# Patient Record
Sex: FEMALE | Race: WHITE | NOT HISPANIC OR LATINO | URBAN - METROPOLITAN AREA
[De-identification: names, ages, dates, MRNs, and addresses within clinical notes are randomized per-mention and may not be internally consistent; named-entity substitution may affect disease eponyms.]

---

## 2022-04-22 ENCOUNTER — OFFICE VISIT (OUTPATIENT)
Dept: URBAN - METROPOLITAN AREA TELEHEALTH 2 | Facility: TELEHEALTH | Age: 72
End: 2022-04-22
Payer: MEDICARE

## 2022-04-22 DIAGNOSIS — K21.9 GERD WITHOUT ESOPHAGITIS: ICD-10-CM

## 2022-04-22 DIAGNOSIS — K58.0 IRRITABLE BOWEL SYNDROME WITH DIARRHEA: ICD-10-CM

## 2022-04-22 DIAGNOSIS — K76.0 NAFLD (NONALCOHOLIC FATTY LIVER DISEASE): ICD-10-CM

## 2022-04-22 PROBLEM — 197315008: Status: ACTIVE | Noted: 2022-04-22

## 2022-04-22 PROBLEM — 197125005: Status: ACTIVE | Noted: 2022-04-22

## 2022-04-22 PROBLEM — 266435005: Status: ACTIVE | Noted: 2022-04-22

## 2022-04-22 PROCEDURE — 99203 OFFICE O/P NEW LOW 30 MIN: CPT | Performed by: INTERNAL MEDICINE

## 2022-04-22 RX ORDER — DUTASTERIDE 0.5 MG/1
TAKE 1 CAPSULE (0.5 MG) BY ORAL ROUTE ONCE DAILY CAPSULE, LIQUID FILLED ORAL 1
Qty: 0 | Refills: 0 | Status: ACTIVE | COMMUNITY
Start: 1900-01-01

## 2022-04-22 RX ORDER — ATORVASTATIN CALCIUM 20 MG/1
TAKE 1 TABLET (20 MG) BY ORAL ROUTE ONCE DAILY TABLET, FILM COATED ORAL 1
Qty: 0 | Refills: 0 | Status: ACTIVE | COMMUNITY
Start: 1900-01-01

## 2022-04-22 RX ORDER — MELATONIN 5 MG
CAPSULE ORAL
Qty: 0 | Refills: 0 | Status: ACTIVE | COMMUNITY
Start: 1900-01-01

## 2022-04-22 NOTE — HPI-TODAY'S VISIT:
This is a telephone OV to which patient has agreed to. Limitations of telehealth discussed; she understands and agrees to proceed. Patient's @ home during this virtual OV. Patient referred by Bobo Alves MD for evaluation of abdominal pain. 72 yo pt w several weeks of p-prandial epigastric pain, nausea, following by  intermittent loose non-bloody stools, wo urgency nor incontinence nor tenesmus. Sxs unrelated to the type of food she eats.  Sxs after eating but inconsistent. On Omeprazole prn + AAs. Intermittent DEVIN wo dysphagia / odynophagia. No nocturnal DEVIN sxs. Good appetite and no weight loss nor constitutional sxs.  Normal bm's for most part x for above noted. Colonoscopy 8/18: normal anastomosis and normal colon biopsies. EGD 1/18: NERD, HH and mild Hp-negative gastritis. Had normal labs recently. No COVID-19 exposure and she's fully COVID-19 vaccinated. No other complants.

## 2023-03-31 ENCOUNTER — LAB OUTSIDE AN ENCOUNTER (OUTPATIENT)
Dept: URBAN - METROPOLITAN AREA TELEHEALTH 2 | Facility: TELEHEALTH | Age: 73
End: 2023-03-31

## 2023-03-31 ENCOUNTER — OFFICE VISIT (OUTPATIENT)
Dept: URBAN - METROPOLITAN AREA TELEHEALTH 2 | Facility: TELEHEALTH | Age: 73
End: 2023-03-31
Payer: MEDICARE

## 2023-03-31 DIAGNOSIS — Z83.71 FAMILY HISTORY OF COLONIC POLYPS: ICD-10-CM

## 2023-03-31 DIAGNOSIS — K76.0 NAFLD (NONALCOHOLIC FATTY LIVER DISEASE): ICD-10-CM

## 2023-03-31 DIAGNOSIS — K21.9 GERD WITHOUT ESOPHAGITIS: ICD-10-CM

## 2023-03-31 DIAGNOSIS — K58.0 IRRITABLE BOWEL SYNDROME WITH DIARRHEA: ICD-10-CM

## 2023-03-31 PROCEDURE — 99203 OFFICE O/P NEW LOW 30 MIN: CPT | Performed by: INTERNAL MEDICINE

## 2023-03-31 RX ORDER — SODIUM, POTASSIUM,MAG SULFATES 17.5-3.13G
354ML SOLUTION, RECONSTITUTED, ORAL ORAL
Qty: 345 MILLILITER | Refills: 0 | OUTPATIENT
Start: 2023-03-31 | End: 2023-04-01

## 2023-03-31 RX ORDER — MELATONIN 5 MG
CAPSULE ORAL
Qty: 0 | Refills: 0 | Status: ACTIVE | COMMUNITY
Start: 1900-01-01

## 2023-03-31 RX ORDER — DUTASTERIDE 0.5 MG/1
TAKE 1 CAPSULE (0.5 MG) BY ORAL ROUTE ONCE DAILY CAPSULE, LIQUID FILLED ORAL 1
Qty: 0 | Refills: 0 | Status: ACTIVE | COMMUNITY
Start: 1900-01-01

## 2023-03-31 RX ORDER — LOSARTAN POTASSIUM 50 MG/1
1 TABLET TABLET ORAL ONCE A DAY
Status: ACTIVE | COMMUNITY
Start: 2023-03-31

## 2023-03-31 RX ORDER — MIRTAZAPINE 15 MG/1
1 TABLET AT BEDTIME TABLET, FILM COATED ORAL ONCE A DAY
Status: ACTIVE | COMMUNITY
Start: 2023-03-31

## 2023-03-31 RX ORDER — ATORVASTATIN CALCIUM 20 MG/1
TAKE 1 TABLET (20 MG) BY ORAL ROUTE ONCE DAILY TABLET, FILM COATED ORAL 1
Qty: 0 | Refills: 0 | Status: ACTIVE | COMMUNITY
Start: 1900-01-01

## 2023-03-31 NOTE — HPI-TODAY'S VISIT:
This is a telephone OV to which patient has agreed to. Limitations of telehealth discussed; she understands and agrees to proceed. Patient's @ home during this virtual OV. Patient referred by Bobo Alves MD for evaluation of  chronic GERD and for screening colonoscopy. 71 yo pt w several weeks of p-prandial epigastric pain, heartburn, regurgitation, hoarseness, frequent throat clearing, wo dysphagia /odynophagia. Sxs unrelated to the type of food she eats.  Sxs after eating but inconsistent. On Omeprazole OTC prn + AAs. Intermittent DEVIN wo dysphagia / odynophagia. No nocturnal DEVIN sxs. Good appetite and no weight loss nor constitutional sxs.  Normal bm's for most part x for above noted. Colonoscopy 8/18: normal anastomosis and normal colon biopsies. EGD 1/18: NERD, HH and mild Hp-negative gastritis. Had normal labs recently. No COVID-19 exposure and she's fully COVID-19 vaccinated. No other complants.

## 2023-05-22 ENCOUNTER — CLAIMS CREATED FROM THE CLAIM WINDOW (OUTPATIENT)
Dept: URBAN - METROPOLITAN AREA CLINIC 4 | Facility: CLINIC | Age: 73
End: 2023-05-22
Payer: MEDICARE

## 2023-05-22 ENCOUNTER — CLAIMS CREATED FROM THE CLAIM WINDOW (OUTPATIENT)
Dept: URBAN - METROPOLITAN AREA SURGERY CENTER 18 | Facility: SURGERY CENTER | Age: 73
End: 2023-05-22

## 2023-05-22 ENCOUNTER — CLAIMS CREATED FROM THE CLAIM WINDOW (OUTPATIENT)
Dept: URBAN - METROPOLITAN AREA SURGERY CENTER 18 | Facility: SURGERY CENTER | Age: 73
End: 2023-05-22
Payer: MEDICARE

## 2023-05-22 DIAGNOSIS — D12.4 ADENOMA OF DESCENDING COLON: ICD-10-CM

## 2023-05-22 DIAGNOSIS — K31.89 OTHER DISEASES OF STOMACH AND DUODENUM: ICD-10-CM

## 2023-05-22 DIAGNOSIS — K21.9 ACID REFLUX: ICD-10-CM

## 2023-05-22 DIAGNOSIS — Z86.010 ADENOMAS PERSONAL HISTORY OF COLONIC POLYPS: ICD-10-CM

## 2023-05-22 PROCEDURE — G8907 PT DOC NO EVENTS ON DISCHARG: HCPCS | Performed by: INTERNAL MEDICINE

## 2023-05-22 PROCEDURE — 88312 SPECIAL STAINS GROUP 1: CPT | Performed by: PATHOLOGY

## 2023-05-22 PROCEDURE — 43239 EGD BIOPSY SINGLE/MULTIPLE: CPT | Performed by: INTERNAL MEDICINE

## 2023-05-22 PROCEDURE — 88305 TISSUE EXAM BY PATHOLOGIST: CPT | Performed by: PATHOLOGY

## 2023-05-22 PROCEDURE — 45385 COLONOSCOPY W/LESION REMOVAL: CPT | Performed by: INTERNAL MEDICINE

## 2023-09-22 ENCOUNTER — OFFICE VISIT (OUTPATIENT)
Dept: URBAN - METROPOLITAN AREA TELEHEALTH 2 | Facility: TELEHEALTH | Age: 73
End: 2023-09-22
Payer: MEDICARE

## 2023-09-22 DIAGNOSIS — K21.9 GERD WITHOUT ESOPHAGITIS: ICD-10-CM

## 2023-09-22 DIAGNOSIS — K57.90 DIVERTICULOSIS: ICD-10-CM

## 2023-09-22 DIAGNOSIS — K76.0 NON ALCOHOLIC FATTY LIVER DISEASE: ICD-10-CM

## 2023-09-22 DIAGNOSIS — K58.0 IRRITABLE BOWEL SYNDROME WITH DIARRHEA: ICD-10-CM

## 2023-09-22 PROCEDURE — 99214 OFFICE O/P EST MOD 30 MIN: CPT | Performed by: INTERNAL MEDICINE

## 2023-09-22 RX ORDER — MELATONIN 5 MG
CAPSULE ORAL
Qty: 0 | Refills: 0 | Status: ACTIVE | COMMUNITY
Start: 1900-01-01

## 2023-09-22 RX ORDER — MIRTAZAPINE 15 MG/1
1 TABLET AT BEDTIME TABLET, FILM COATED ORAL ONCE A DAY
Status: ACTIVE | COMMUNITY
Start: 2023-03-31

## 2023-09-22 RX ORDER — LOSARTAN POTASSIUM 50 MG/1
1 TABLET TABLET ORAL ONCE A DAY
Status: ACTIVE | COMMUNITY
Start: 2023-03-31

## 2023-09-22 RX ORDER — DUTASTERIDE 0.5 MG/1
TAKE 1 CAPSULE (0.5 MG) BY ORAL ROUTE ONCE DAILY CAPSULE, LIQUID FILLED ORAL 1
Qty: 0 | Refills: 0 | Status: ACTIVE | COMMUNITY
Start: 1900-01-01

## 2023-09-22 RX ORDER — ATORVASTATIN CALCIUM 20 MG/1
TAKE 1 TABLET (20 MG) BY ORAL ROUTE ONCE DAILY TABLET, FILM COATED ORAL 1
Qty: 0 | Refills: 0 | Status: ACTIVE | COMMUNITY
Start: 1900-01-01

## 2023-09-22 NOTE — HPI-TODAY'S VISIT:
This is a Telehealth OV to which patient has agreed to. Limitations of telehealth discussed; she understands and agrees to proceed. Patient's @ home during this virtual OV. Patient referred by Bobo Alves MD for evaluation of  chronic GERD and for screening colonoscopy. 74 yo pt  for abdominal pain and dyspepsia follow up. She has been feeling better lately w less GERD sxs after eating but inconsistent. On Omeprazole OTC prn + AAs. Intermittent DEVIN wo dysphagia / odynophagia. No nocturnal DEVIN sxs. Good appetite and no weight loss nor constitutional sxs.  Normal bm's for most part x for above noted. Colonoscopy 8/18: normal anastomosis and normal colon biopsies. EGD 5/23: GERD wo BE, HH and mild Hp-negative gastritis w normal duodenal bx's. Colonoscopy 5/23: normal sigmoid anastomosis. L-diverticulosis, AC pp, TA and E - Hrrds. Had normal labs recently. No COVID-19 exposure and she's fully COVID-19 vaccinated. No other complaints.

## 2023-09-25 ENCOUNTER — DASHBOARD ENCOUNTERS (OUTPATIENT)
Age: 73
End: 2023-09-25

## 2023-09-25 PROBLEM — 428283002: Status: ACTIVE | Noted: 2023-09-25

## 2023-09-25 PROBLEM — 397881000: Status: ACTIVE | Noted: 2023-09-25
